# Patient Record
Sex: FEMALE | Race: WHITE | NOT HISPANIC OR LATINO | Employment: OTHER | URBAN - METROPOLITAN AREA
[De-identification: names, ages, dates, MRNs, and addresses within clinical notes are randomized per-mention and may not be internally consistent; named-entity substitution may affect disease eponyms.]

---

## 2023-10-13 ENCOUNTER — HOSPITAL ENCOUNTER (OUTPATIENT)
Dept: RADIATION ONCOLOGY | Facility: HOSPITAL | Age: 68
Setting detail: RADIATION/ONCOLOGY SERIES
Discharge: STILL ADMITTED OR ADMINISTRATIVE | End: 2023-10-13
Payer: MEDICARE

## 2023-10-13 PROCEDURE — 99214 OFFICE O/P EST MOD 30 MIN: CPT | Mod: GC,95 | Performed by: RADIOLOGY

## 2023-10-13 PROCEDURE — 99215 OFFICE O/P EST HI 40 MIN: CPT | Mod: 95 | Performed by: RADIOLOGY

## 2023-10-13 PROCEDURE — 99204 OFFICE O/P NEW MOD 45 MIN: CPT | Performed by: RADIOLOGY

## 2023-10-13 ASSESSMENT — ENCOUNTER SYMPTOMS
CHILLS: 0
CHEST TIGHTNESS: 0
UNEXPECTED WEIGHT CHANGE: 0
DYSURIA: 0
FLANK PAIN: 0
EXTREMITY WEAKNESS: 0
CONFUSION: 0
ABDOMINAL PAIN: 0
DIAPHORESIS: 0
FREQUENCY: 0
FATIGUE: 0
FEVER: 0
RESPIRATORY NEGATIVE: 1
APPETITE CHANGE: 0
GASTROINTESTINAL NEGATIVE: 1
MUSCULOSKELETAL NEGATIVE: 1
ABDOMINAL DISTENTION: 0
CARDIOVASCULAR NEGATIVE: 1
NEUROLOGICAL NEGATIVE: 1
SORE THROAT: 1
LIGHT-HEADEDNESS: 0
BACK PAIN: 0
TROUBLE SWALLOWING: 0
HEMATOLOGIC/LYMPHATIC NEGATIVE: 1
COUGH: 0
VOICE CHANGE: 0
SPEECH DIFFICULTY: 0
NERVOUS/ANXIOUS: 0

## 2023-10-13 NOTE — PROGRESS NOTES
Radiation Oncology Outpatient Virtual Consult    Patient Name:  Hunter Farmer  MRN:  [id number]  :  1955    Referring Provider: Self-Referral  Primary Care Provider: No primary care provider on file.  Care Team: No care team member to display    Date of Service: 10/13/2023     SUBJECTIVE  History of Present Illness:  Hunter Farmer is a 68 y.o. female who was referred by No ref. provider found, for a consultation to the [facility] Department of Radiation Oncology.  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 The patient is here as a self-referral as direction from her primary radiation oncologist, Dr. Romero of [facility] in [place].    Ms. 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 The patient then completed chemoradiation on 12 with 70.2Gy/33fx. Since then, the patient has been on routine maintenance with her primary medical oncologist Dr. Светлана Hodges, her primary radiation oncologist Dr. Ephraim Romero, and her ENT Dr. bAner Munoz. The patient noted that she has had new onset pain in her left ear, left jaw, and back of tongue, which prompted her to get evaluation. Her ENT, Dr. Munoz, sent her for an MRI brain on 23, which revealed some hyperintensity at the base of skull near the left IAC. The patient had an repeat MRI brain on 23 which revealed left petrous erosion with enhancing tumor likely abutting the petrous ICA midly narrowing and extending into the parapharyngeal space along mandibular nerve. There is perineural tumor spread from the mandibular nerve along the left auriculotemporal nerve towards the mandible/stylomandibular tunnel. However, a repeat read of the imaging showed it could represent  scarring. The patient completed a PET/CT on 23, which showed her left tonsil with hypermetabolic activity, in which she noted that she went for resection, and pathology revealing no active malignancy or local recurrence of her previous primary.    When speaking with the patient, she is accompanied over video with her friend. The patient notes to have no difficulty hearing, swallowing, taste, she has had no changes in her appetite. She notes minimal facial numbness, but notes to have some occasional pain, but denies requiring increasing pain meds at this time.     PMHx: Depression, HTN, DVT, GERD, HLD, hyperparathyroidism, Raynaud's disease, LEXY, vertigo  PSHx: appendectomy, bladder suspension , cataract removal,  x2, cholecystectomy, hysterectomy on 5/5/15, incisional hernia repair, left labryinthectomy with mastoidectomy, parathyroidectomy on 22, resection of temporal bone on 12, right knee surgery, tonsillectomy, shoulder surgery  FHx: Mother with lung cancer, father with unspecified malignancy  SHx: Lifetime non-smoker, non-drinker      Prior Radiotherapy:  Yes, describe: see HPI  Radiation Treatments       No radiation treatments to show. (Treatments may have been administered in another system.)          Current Systemic Treatment:  No     Presence of Pacemaker or ICD:  No    Past Medical History:  No past medical history on file.     Past Surgical History:  No past surgical history on file.     Family History:  Cancer-related family history is not on file.    Social History:       Allergies:  Not on File     Medications:  No current outpatient medications on file.      Review of Systems:  Review of Systems   Constitutional:  Negative for appetite change, chills, diaphoresis, fatigue, fever and unexpected weight change.   HENT:   Positive for sore throat. Negative for hearing loss, lump/mass, mouth sores, trouble swallowing and voice change.    Respiratory: Negative.  Negative  for chest tightness and cough.    Cardiovascular: Negative.    Gastrointestinal: Negative.  Negative for abdominal distention and abdominal pain.   Genitourinary:  Negative for bladder incontinence, dysuria and frequency.    Musculoskeletal: Negative.  Negative for back pain and flank pain.   Skin: Negative.    Neurological: Negative.  Negative for extremity weakness, light-headedness and speech difficulty.   Hematological: Negative.    Psychiatric/Behavioral:  Negative for confusion. The patient is not nervous/anxious.      The patient's current pain level was assessed.  They report currently having a pain of 2 out of 10.  They feel their pain is under control with the use of pain medications.    Performance Status:  The Karnofsky performance scale today is 80, Normal activity with effort; some signs or symptoms of disease (ECOG equivalent 1).        OBJECTIVE  Physical Exam:  There were no vitals taken for this visit.   Physical Exam  Constitutional:       General: She is not in acute distress.     Appearance: Normal appearance. She is not ill-appearing.   HENT:      Head: Normocephalic and atraumatic.   Eyes:      Extraocular Movements: Extraocular movements intact.   Pulmonary:      Effort: Pulmonary effort is normal. No respiratory distress.   Musculoskeletal:      Cervical back: Neck supple.   Neurological:      Mental Status: She is alert.          Laboratory Review:  There are no laboratory contraindications to radiation therapy.    The pertinent lab results were reviewed and discussed with the patient.   Last BMP:  [unfilled]    Pathology Review:  The pertinent pathology results were reviewed and discussed with the patient. Records have been scanned into her chart.      Imaging:  The pertinent imaging results were reviewed and discussed with the patient.  Notably, her MRI brain from 2/23/23 and 4/18/23 were reviewed personally.        ASSESSMENT:  Hunter Farmer is a 68 y.o. 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     PLAN:  We discussed in detail with the patient regarding her clinical, pathological, and imaging related results. We discussed her imaging results, including review of her two MRI brains that were completed this prior year, with her last MRI brain with and without contrast completed in April 2023. We discussed how between the 2 month interval between her two MRI brains, there were stability in her lesions without further invasion. The radiology read of her imaging comment that the hyperintensity may represent scarring, and given that this is an inaccessible location for safe biopsy, we recommend two short-interval scans with MRI brain w/w/o contrast with volumetric scan [id number] to assess stability of the lesion. We also discussed the specificity of the PET/CT to evaluate lesions at the base of skull, and given the proximity to the temporal lobe and avidity to the brain, it is hard to distinguish if this is truly avidity from the lesion.    If the lesion is indeed stable, the patient may better opt for ongoing observation. However, if the lesion does continue to show concern or the patient has new or worsening neurological or pain symptoms, it may be concerning for worsening perineural invasion which would be concerning for locoregional recurrence. In that case, we do recommend being evaluated for possible reirradiation with proton therapy, however, at our center, we discussed our concern for our passive-scatter proton therapy machine, which would not be able to carve dose away from critical structures including the brain stem, temporal lobe, which  have previously been irradiated, having a high-risk for necrosis. Given this, we recommend the patient ask her primary oncologist and radiation oncologist to reach out [facility] center for discussion of intensity-modulated proton therapy (IMPT). The patient and her friend agreed with the current plan, and at this time, will not proceed with any radiation therapy, and will be getting short-interval scans with her primary oncologists.     Plan:  -MRI brain w/w/o contrast with volumetric scan [id number] to assess stability of lesion  -If showing concern for worsening invasion, would recommend referral to [facility] [place] for IMPT evaluation    NCCN Guidelines were applicable to guide this patients treatment plan.     The patient was evaluated by and discussed with attending physician, Dr. Maria Teresa Escalona, who repeated all key aspects of the HPI and physical exam.    Edmundo Munoz MD  PGY-3 Radiation Oncology    I participated in this patient's virtual visit and agree with the impression and plan described above.  Given that she has not had any recent imaging, I recommended she undergo repeat MRI/skull base to determine whether the abnormalities are stable or not.  Previous scans reports that it is unclear whether she has scar tissue versus recurrent cancer at the skull base.  I called Dr. Ephraim Romero's office to relay this information. Unfortunately, our proton technology would not be able to target the skull base quickly with passive scatter technique to avoid normal tissues.  If she is felt to require radiation in future, I will recommend that she be seen at a center with intensity modulated proton therapy.  -Maria Teresa Escalona MD